# Patient Record
Sex: FEMALE | URBAN - METROPOLITAN AREA
[De-identification: names, ages, dates, MRNs, and addresses within clinical notes are randomized per-mention and may not be internally consistent; named-entity substitution may affect disease eponyms.]

---

## 2024-08-01 ENCOUNTER — OFFICE VISIT (OUTPATIENT)
Dept: URBAN - METROPOLITAN AREA CLINIC 113 | Facility: CLINIC | Age: 39
End: 2024-08-01
Payer: COMMERCIAL

## 2024-08-01 ENCOUNTER — DASHBOARD ENCOUNTERS (OUTPATIENT)
Age: 39
End: 2024-08-01

## 2024-08-01 VITALS
DIASTOLIC BLOOD PRESSURE: 69 MMHG | TEMPERATURE: 97.9 F | WEIGHT: 120.4 LBS | HEIGHT: 67 IN | HEART RATE: 66 BPM | BODY MASS INDEX: 18.9 KG/M2 | RESPIRATION RATE: 12 BRPM | SYSTOLIC BLOOD PRESSURE: 99 MMHG

## 2024-08-01 DIAGNOSIS — R14.0 ABDOMINAL BLOATING: ICD-10-CM

## 2024-08-01 DIAGNOSIS — K58.0 IRRITABLE BOWEL SYNDROME WITH DIARRHEA: ICD-10-CM

## 2024-08-01 DIAGNOSIS — R19.7 INTERMITTENT DIARRHEA: ICD-10-CM

## 2024-08-01 PROBLEM — 197125005: Status: ACTIVE | Noted: 2024-08-01

## 2024-08-01 PROCEDURE — 99203 OFFICE O/P NEW LOW 30 MIN: CPT | Performed by: NURSE PRACTITIONER

## 2024-08-01 RX ORDER — SERTRALINE 100 MG/1
1 TABLET TABLET, FILM COATED ORAL ONCE A DAY
Status: ACTIVE | COMMUNITY

## 2024-08-01 NOTE — HPI-TODAY'S VISIT:
38 yo woman with a history of anxiety and depression, presenting for evaluation of IBS.  Labs 1/3/24: WBC 4.4, Hg 13.2, Plt 194, BUN 11, Crt 0.67, Na 142, K 4, Tbili 2.2, ALP 49. . ALT 11.  She tells me that she was first diagnosed with IBS when she was in college. She started with various GI symptoms. She had an EGD, celiac testing, gastric emptying study, all of which were normal. She modifies her diet and lifestyle to manage her symptoms. She works a psychologist, and is very aware of the stress that impacts her symptoms. She describes episodes of diarrhea, characterized as increased stool frequency, urgency, abdominal cramping and bloating.   There is no trouble with swallowing. There is no heartburn, nausea or vomiting. Her weight is stable. She is eating a fairly normal diet, and admits a fair appetite. At baseline, she has one fairly normal formed stool per day. She has not identified specific food triggers for diarrhea, with exception to greasy, fried foods. During episodes, she eats a bland diet, and avoids any raw vegetables. There is no bloow per rectum. No melena.